# Patient Record
Sex: FEMALE | Race: BLACK OR AFRICAN AMERICAN | NOT HISPANIC OR LATINO | Employment: OTHER | URBAN - METROPOLITAN AREA
[De-identification: names, ages, dates, MRNs, and addresses within clinical notes are randomized per-mention and may not be internally consistent; named-entity substitution may affect disease eponyms.]

---

## 2024-02-15 ENCOUNTER — OFFICE VISIT (OUTPATIENT)
Dept: NEUROSURGERY | Facility: CLINIC | Age: 69
End: 2024-02-15
Payer: MEDICARE

## 2024-02-15 DIAGNOSIS — M48.061 FORAMINAL STENOSIS OF LUMBAR REGION: Primary | ICD-10-CM

## 2024-02-15 PROCEDURE — 99203 OFFICE O/P NEW LOW 30 MIN: CPT | Mod: S$GLB,,, | Performed by: STUDENT IN AN ORGANIZED HEALTH CARE EDUCATION/TRAINING PROGRAM

## 2024-02-15 RX ORDER — ALENDRONATE SODIUM 70 MG/1
70 TABLET ORAL
COMMUNITY

## 2024-02-15 RX ORDER — METHOCARBAMOL 500 MG/1
500 TABLET, FILM COATED ORAL 2 TIMES DAILY PRN
COMMUNITY
Start: 2023-09-08 | End: 2024-03-25 | Stop reason: SDUPTHER

## 2024-02-15 RX ORDER — ATORVASTATIN CALCIUM 20 MG/1
TABLET, FILM COATED ORAL DAILY
COMMUNITY

## 2024-02-15 RX ORDER — METOPROLOL TARTRATE 50 MG/1
50 TABLET ORAL 2 TIMES DAILY
COMMUNITY

## 2024-02-15 RX ORDER — ALPRAZOLAM 0.25 MG/1
0.25 TABLET ORAL
COMMUNITY
Start: 2023-10-26

## 2024-02-15 RX ORDER — TRAMADOL HYDROCHLORIDE 50 MG/1
50 TABLET ORAL DAILY PRN
COMMUNITY
Start: 2023-12-28

## 2024-02-15 RX ORDER — AMLODIPINE BESYLATE 5 MG/1
TABLET ORAL
COMMUNITY

## 2024-02-15 RX ORDER — GABAPENTIN 600 MG/1
600 TABLET ORAL 2 TIMES DAILY
COMMUNITY
Start: 2023-11-29 | End: 2024-05-01 | Stop reason: ALTCHOICE

## 2024-02-15 RX ORDER — PREGABALIN 50 MG/1
CAPSULE ORAL
COMMUNITY
Start: 2023-12-28 | End: 2024-05-01 | Stop reason: SDUPTHER

## 2024-02-15 NOTE — PROGRESS NOTES
Neurosurgery  History & Physical    SUBJECTIVE:     Chief Complaint: Low back, left hip, bilateral calf pain    History of Present Illness:  68 F smoker presents for eval of progressive worsening of low back, left hip and bilateral calf pain.  Her low back and leg pain are similar in intensity.  Her left hip pain is most noticed when she lays on her left side.  She endorses cramping pain in her bilateral calves especially at night.  She notes some mild improvement in her bilateral calf pain when she is walking.  She hasn't been to PT for her low back and leg symptoms.  She is actively trying to quit smoking.    Review of patient's allergies indicates:  Not on File    Current Outpatient Medications   Medication Sig Dispense Refill    alendronate (FOSAMAX) 70 MG tablet Take 70 mg by mouth every 7 days.      amLODIPine (NORVASC) 5 MG tablet TAKE ONE TABLET BY MOUTH EVERY DAY for 90 days      atorvastatin (LIPITOR) 20 MG tablet Take by mouth once daily.      gabapentin (NEURONTIN) 600 MG tablet Take 600 mg by mouth 2 (two) times daily.      LYRICA 50 mg capsule Take by mouth.      methocarbamoL (ROBAXIN) 500 MG Tab Take 500 mg by mouth 2 (two) times daily as needed.      metoprolol tartrate (LOPRESSOR) 50 MG tablet Take 50 mg by mouth 2 (two) times daily.      traMADoL (ULTRAM) 50 mg tablet Take 50 mg by mouth daily as needed.      XANAX 0.25 mg tablet Take 0.25 mg by mouth.       No current facility-administered medications for this visit.       No past medical history on file.  No past surgical history on file.  Family History    None       Social History     Socioeconomic History    Marital status: Legally        Review of Systems  14 point ROS was negative    OBJECTIVE:     Vital Signs  Pain Score:   8  There is no height or weight on file to calculate BMI.      Physical Exam:    Constitutional: She appears well-developed and well-nourished.     Eyes: Pupils are equal, round, and reactive to light.      Cardiovascular: Normal rate and regular rhythm.     Abdominal: Soft.     Psych/Behavior: She is alert. She is oriented to person, place, and time. She has a normal mood and affect.     Musculoskeletal: Gait is normal.        Neck: Range of motion is limited.        Back: Range of motion is limited.        Right Upper Extremities: Muscle strength is 5/5.        Left Upper Extremities: Muscle strength is 5/5.       Right Lower Extremities: Muscle strength is 5/5.        Left Lower Extremities: Muscle strength is 5/5.     Neurological:        Coordination: She has a normal Romberg Test and normal tandem walking coordination.        Sensory: There is no sensory deficit in the trunk. There is no sensory deficit in the extremities.        DTRs: DTRs are DTRS NORMAL AND SYMMETRICnormal and symmetric.        Cranial nerves: Cranial nerve(s) II, III, IV, V, VI, VII, VIII, IX, X, XI and XII are intact.     No hunter's bilaterally    +facet loading bilaterally near lumbosacral jxn    Diagnostic Results:  MRI L spine: stepwise minimal anterolisthesis L2-5.  Moderate right sided foraminal stenosis at L5/S1.  No other additional areas of high grade central or foraminal stenosis.  Mild bilateral facet effusions at L5/S1.  Diffuse loss of lumbar lordosis.  Reviewed    ASSESSMENT/PLAN:     68 F smoker who presents with chronic worsening of axial low back and BLE radicular leg pain.  Her imaging is described above.  Will plan to get scoli, flex/ex L, CT L, orders for PT/pain mgmt.  Fu once completed.  Stressed the importance of smoking cessation.

## 2024-02-22 ENCOUNTER — HOSPITAL ENCOUNTER (OUTPATIENT)
Dept: RADIOLOGY | Facility: HOSPITAL | Age: 69
Discharge: HOME | End: 2024-02-22
Attending: STUDENT IN AN ORGANIZED HEALTH CARE EDUCATION/TRAINING PROGRAM
Payer: MEDICARE

## 2024-02-22 DIAGNOSIS — M48.061 FORAMINAL STENOSIS OF LUMBAR REGION: ICD-10-CM

## 2024-02-22 PROCEDURE — 72114 X-RAY EXAM L-S SPINE BENDING: CPT | Mod: 26,,, | Performed by: RADIOLOGY

## 2024-02-22 PROCEDURE — 72131 CT LUMBAR SPINE W/O DYE: CPT | Mod: TC

## 2024-02-22 PROCEDURE — 72131 CT LUMBAR SPINE W/O DYE: CPT | Mod: 26,,, | Performed by: RADIOLOGY

## 2024-02-22 PROCEDURE — 72082 X-RAY EXAM ENTIRE SPI 2/3 VW: CPT | Mod: 26,,, | Performed by: STUDENT IN AN ORGANIZED HEALTH CARE EDUCATION/TRAINING PROGRAM

## 2024-02-22 PROCEDURE — 72114 X-RAY EXAM L-S SPINE BENDING: CPT | Mod: TC

## 2024-02-22 PROCEDURE — 72082 X-RAY EXAM ENTIRE SPI 2/3 VW: CPT | Mod: TC

## 2024-02-27 ENCOUNTER — TELEPHONE (OUTPATIENT)
Dept: PAIN MEDICINE | Facility: CLINIC | Age: 69
End: 2024-02-27

## 2024-02-27 ENCOUNTER — OFFICE VISIT (OUTPATIENT)
Dept: PAIN MEDICINE | Facility: CLINIC | Age: 69
End: 2024-02-27
Payer: MEDICARE

## 2024-02-27 VITALS — HEART RATE: 66 BPM | SYSTOLIC BLOOD PRESSURE: 145 MMHG | DIASTOLIC BLOOD PRESSURE: 82 MMHG

## 2024-02-27 DIAGNOSIS — M51.37 DDD (DEGENERATIVE DISC DISEASE), LUMBOSACRAL: Primary | ICD-10-CM

## 2024-02-27 DIAGNOSIS — M76.32 ILIOTIBIAL BAND SYNDROME AFFECTING LEFT LOWER LEG: ICD-10-CM

## 2024-02-27 DIAGNOSIS — M46.1 SACROILIITIS: ICD-10-CM

## 2024-02-27 DIAGNOSIS — M54.17 LUMBOSACRAL RADICULOPATHY: ICD-10-CM

## 2024-02-27 DIAGNOSIS — M48.061 FORAMINAL STENOSIS OF LUMBAR REGION: ICD-10-CM

## 2024-02-27 DIAGNOSIS — M70.62 GREATER TROCHANTERIC BURSITIS, LEFT: ICD-10-CM

## 2024-02-27 PROCEDURE — 99204 OFFICE O/P NEW MOD 45 MIN: CPT | Mod: S$GLB,,, | Performed by: EMERGENCY MEDICINE

## 2024-02-27 PROCEDURE — 99999 PR PBB SHADOW E&M-EST. PATIENT-LVL III: CPT | Mod: PBBFAC,,, | Performed by: EMERGENCY MEDICINE

## 2024-02-27 NOTE — TELEPHONE ENCOUNTER
----- Message from Mckinley Covarrubias MD sent at 2024 12:57 PM CST -----  Regarding: Order for GERI QUIROS    Patient Name: GERI QUIROS([id number])  Sex: Female  : 1955      PCP: KESHIA BANERJEE    Center: None     Types of orders made on 2024: Outpatient Referral, Procedure Request    Order Date:2024  Ordering User:MCKINLEY COVARRUBIAS [[id number]]  Encounter Provider:Mckinley Covarrubias MD [[id number]]  Authorizing Prov  ider: Mckinley Coavrrubias MD [[id number]]  Department:[facility][[id number]]    Common Order Information  Procedure -> Transforaminal Injection (Specify level and laterality) Cmt:             Bilateral L5/S1    Order Specific Information  Order: Procedure Order to Pain Management [Custom: [id number]]  Order #:          [id number]: 1 FUTURE    Priority: Routine  Class: Clinic Performed    Future Order I  nformation      Expires on:2025            Expected by:2024                   Associated Diagnoses      M51.37 DDD (degenerative disc disease), lumbosacral      M54.17 Lumbosacral radiculopathy      Physician -> bakari         Facility Name: -> [facility]           Priority: Routine  Class: Clinic Performed    Future Order Information      Expires on:2025            Expected by:                   Associated Diagnoses      M51.37 DDD (degenerative disc disease), lumbosacral      M54.17 Lumbosacral radiculopathy      Procedure -> Transforaminal Injection (Specify level and laterality) Cmt:                 Bilateral L5/S1        Physician -> bakari         Facility Name: -> [facility]

## 2024-02-27 NOTE — PROGRESS NOTES
This note was completed with dictation software and grammatical errors may exist.    PCP: Evita Gutierrez MD    Chief Complaint   Patient presents with    Low-back Pain    Hip Pain    Leg Pain    Shoulder Pain        Original HPI: Carmen Garibay is a 69 y.o. year old female patient who has no past medical history on file. She presents in referral from Dr. Ryder Cunningham for back pain    Original Pain Description:  The pain is located in the lower back and is radiating to the lateral ankles bilaterally.  The pain is described as burning, sharp, stabbing, and tingling. Exacerbating factors: Sitting, Standing, Bending, Walking, and Getting out of bed/chair. Mitigating factors medications. Symptoms interfere with daily activity and sleeping. The patient feels like symptoms have been worsening. Patient denies significant motor weakness. Pain has been present for several months.     Patient also complaining of left lateral hip pain for several months that is worse with walking. She also complains of pain in her left buttock as well.     PAIN SCORES:  Best: Pain is 9  Current: Pain is 9  Worst: Pain is 10        2/27/2024    10:55 AM   Last 3 PDI Scores   Pain Disability Index (PDI) 63       6 weeks of Conservative therapy:  PT: Not yet  Chiro:  HEP: Not yet      Treatments / Medications: (Ice/Heat/NSAIDS/APAP/etc):  Carlos 600mg - not taking due to side effects  Xanax 0.25mg  Lyrica 50mg BID  Robaxin 500mg qday  Tramadol 50mg PRN Qday    Antiplatelets/Anticoagulants:  No    Interventional Pain Procedures: (Previous injections)  None    IMAGING:    Images reviewed by me 2/27/24   MRI Lumbar spine: L5/S1 Moderate right sided foraminal stenosis, bilateral facet effusions at L5/S1.     No past surgical history on file.    Social History     Socioeconomic History    Marital status: Legally        Medications/Allergies: See med card    ROS:  GENERAL: No fever. No chills. No fatigue. Denies weight loss. Denies  "weight gain.  Back / musculoskeletal / neuro : See HPI    VITALS:   Vitals:    02/27/24 1059   BP: (!) 145/82   Pulse: 66   PainSc:   9   PainLoc: Back     There is no height or weight on file to calculate BMI.      2/27/2024    10:55 AM   Last 3 PDI Scores   Pain Disability Index (PDI) 63       PHYSICAL EXAM:   GENERAL: Well appearing, in no acute distress, alert and oriented x3.  PSYCH:  Mood and affect appropriate.  SKIN: Skin color, texture, turgor normal, no rashes or lesions.  HEENT:  Normocephalic, atraumatic. Cranial nerves grossly intact.  NECK: No pain to palpation over the cervical paraspinous muscles. No pain to palpation over facets. No pain with neck flexion, extension, or lateral flexion.   PULM: No evidence of respiratory difficulty, symmetric chest rise.  GI:  Non-distended  BACK:  Limited range of motion.  Tenderness to palpation at lower facets.  Pain with axial loading bilaterally.  Tenderness to palpation bilateral SI joints.    EXTREMITIES: No deformities, edema, or skin discoloration. Tenderness to palpation left GTB, left ITB and calf.  MUSCULOSKELETAL: Shoulder, hip, and knee provocative maneuvers are negative. No atrophy is noted.  NEURO: Sensation is equal and appropriate bilaterally. Bilateral upper and lower extremity strength is normal and symmetric. Bilateral upper and lower extremity coordination and muscle stretch reflexes are physiologic and symmetric. Plantar response are downgoing. Straight leg raising in the supine position is negative to radicular pain.   GAIT: normal.      LABS:    No results found for: "LABA1C", "HGBA1C"    No results found for: "WBC", "HGB", "HCT", "MCV", "PLT"        ASSESSMENT: 69 y.o. year old female with pain, consistent with:    Encounter Diagnoses   Name Primary?    Foraminal stenosis of lumbar region     DDD (degenerative disc disease), lumbosacral Yes    Lumbosacral radiculopathy     Sacroiliitis     Greater trochanteric bursitis, left     Iliotibial " band syndrome affecting left lower leg        DISCUSSION: Carmen Garibay is a patient referred to us by Neurosurgery who comes to us with chronic lower back pain with bilateral radiculopathy, left sacroiliitis and GTB.  Patient would like us to focus on the radicular component of her pain 1st.    PLAN:  - I have stressed the importance of physical activity and a home exercise plan to help with pain and improve health.  - Patient can continue with medications for now since they are providing benefits, using them appropriately, and without side effects.  - Counseled patient regarding the importance of activity modification and physical therapy.  - Interventions: Schedule for a bilateral Transforaminal epidural steroid injection at L5/S1 to help with their pain and progress with a home exercise plan.. Explained the risks and benefits of the procedure in detail with the patient today in clinic along with alternative treatment options, and the patient elected to pursue the intervention at this time.    - Anticoagulation use: No no anticoagulation  - Imaging: Reviewed available imaging with patient and answered any questions they had regarding study.  - Records: Reviewed/Obtain imaging from outside physicians    - The patient's pathophysiology was explained in detail with reference to x-rays, models, other visual aids as appropriate.   - Follow up visit: return to clinic in 2 weeks after procedure      I would like to thank Ryder Cunningham DO for the opportunity to assist in the care of this patient. We had a very nice visit and I look forward to continuing their care. Please let me know if I can be of further assistance.     Mckinley Covarrubias MD  02/27/2024

## 2024-02-27 NOTE — TELEPHONE ENCOUNTER
----- Message from Mckinley Covarrubias MD sent at 2024 12:57 PM CST -----  Regarding: Order for GERI QUIROS    Patient Name: GERI QUIROS([id number])  Sex: Female  : 1955      PCP: KESHIA BANERJEE    Center: None     Types of orders made on 2024: Outpatient Referral, Procedure Request    Order Date:2024  Ordering User:MCKINLEY COVARRUBIAS [[id number]]  Encounter Provider:Mckinley Covarrubias MD [[id number]]  Authorizing Prov  ider: Mckinley Covarrubias MD [[id number]]  Department:[facility][[id number]]    Common Order Information  Procedure -> Transforaminal Injection (Specify level and laterality) Cmt:             Bilateral L5/S1    Order Specific Information  Order: Procedure Order to Pain Management [Custom: [id number]]  Order #:          [id number]: 1 FUTURE    Priority: Routine  Class: Clinic Performed    Future Order I  nformation      Expires on:2025            Expected by:2024                   Associated Diagnoses      M51.37 DDD (degenerative disc disease), lumbosacral      M54.17 Lumbosacral radiculopathy      Physician -> bakari         Facility Name: -> [facility]           Priority: Routine  Class: Clinic Performed    Future Order Information      Expires on:2025            Expected by:                   Associated Diagnoses      M51.37 DDD (degenerative disc disease), lumbosacral      M54.17 Lumbosacral radiculopathy      Procedure -> Transforaminal Injection (Specify level and laterality) Cmt:                 Bilateral L5/S1        Physician -> bakari         Facility Name: -> [facility]

## 2024-03-11 ENCOUNTER — TELEPHONE (OUTPATIENT)
Dept: PAIN MEDICINE | Facility: CLINIC | Age: 69
End: 2024-03-11
Payer: MEDICARE

## 2024-03-14 ENCOUNTER — HOSPITAL ENCOUNTER (OUTPATIENT)
Dept: RADIOLOGY | Facility: OTHER | Age: 69
Discharge: HOME | End: 2024-03-14
Attending: INTERNAL MEDICINE
Payer: MEDICARE

## 2024-03-14 DIAGNOSIS — R91.1 LUNG NODULE: ICD-10-CM

## 2024-03-14 PROCEDURE — 71250 CT THORAX DX C-: CPT | Mod: 26,,, | Performed by: RADIOLOGY

## 2024-03-14 PROCEDURE — 71250 CT THORAX DX C-: CPT | Mod: TC

## 2024-03-15 ENCOUNTER — TELEPHONE (OUTPATIENT)
Dept: PAIN MEDICINE | Facility: CLINIC | Age: 69
End: 2024-03-15
Payer: MEDICARE

## 2024-03-15 NOTE — PRE-PROCEDURE INSTRUCTIONS
Pt currently on abx for a cyst, pt states she started on yesterday 3/14 and will be on for a total of 10 days.  Provider's office made aware.

## 2024-03-25 DIAGNOSIS — M54.17 LUMBOSACRAL RADICULOPATHY: Primary | ICD-10-CM

## 2024-03-25 RX ORDER — METHOCARBAMOL 500 MG/1
500 TABLET, FILM COATED ORAL 2 TIMES DAILY PRN
Qty: 60 TABLET | Refills: 1 | Status: SHIPPED | OUTPATIENT
Start: 2024-03-25

## 2024-04-02 ENCOUNTER — TELEPHONE (OUTPATIENT)
Dept: PAIN MEDICINE | Facility: CLINIC | Age: 69
End: 2024-04-02
Payer: MEDICARE

## 2024-04-05 ENCOUNTER — TELEPHONE (OUTPATIENT)
Dept: PAIN MEDICINE | Facility: CLINIC | Age: 69
End: 2024-04-05
Payer: MEDICARE

## 2024-04-05 NOTE — PRE-PROCEDURE INSTRUCTIONS
Patient reviewed on 4/5/2024.  Okay to proceed at [facility]. The following pre-procedure instructions and arrival time have been reviewed with patient via phone.  Patient verbalized an understanding.  Pt to be accompanied by  day of procedure as responsible .    Dear Carmen,    You are scheduled for a procedure with Dr. Covarrubias on 04/9/2024  Your scheduled arrival time is 9:00 am.  This arrival time is roughly 1 hour before your anticipated procedure time to allow sufficient time for pre-op..  Please wear comfortable clothes.  Most patients do not need to change into a gown.  Please do not wear a dress.  This procedure will take place at the [facility] at the corner of [facility] and [facility].  It is in the [facility] next to [facility].  The address is:    [address] [place] [address].  [place] [id number]    After entering the building, you will proceed to the second floor where you can check in with registration. You should take any medications that you routinely take for blood pressure, heart medications, thyroid, cholesterol, etc.     The fasting restrictions are dependent on whether or not you are receiving sedation.  Sedation is not available for all procedures.     Your fasting instructions are as follow:  IV sedation. You should not eat for 8 hours and can only drink clear liquids (water or black coffee without cream/sugar) up until 2 hours before your scheduled time.  You CANNOT drive yourself and must have a .    If you are on blood thinners, you need to follow the anticoagulation instructions that had been discussed previously.  You should only stop the blood thinners if it was approved by your primary care physician or your cardiologist.  In the event that you are not able to stop your blood thinners, a blood thinner was not listed on your medication list, or we were not able to get clearance from your cardiologist, then the procedure may have to  be postponed/canceled.     IF you were told to stop your blood thinners, this is how long you should generally hold some of the more common ones.  Remember that stopping blood thinners is only necessary for certain procedures. If you are unsure of your instructions, please call us.   Aspirin - 5 days  Plavix/Clopidogrel - 7 days  Warfarin / Coumadin - 5 days  Eliquis - 3 days  Pradaxa/Dabigatran - 4 days  Xarelto/Rivaroxaban - 3 days    If you are a diabetic, do not take your medication if you will be fasting, but bring it with you. Please plan on being here for roughly 2 hours.     Please call us if you have been sick (running fever, having any flu-like symptoms) or have been taking antibiotics in the past 2 weeks or had any outpatient procedures other than with us (colonoscopy, endoscopy, OBGYN, dental, etc.). If you have been previously COVID positive, you will need to hold off on your procedure until you are symptom free for 10 days. If you did not have any symptoms, you can have your procedure 10 days from your positive test result.      *HOLD ALL VITAMINS, MINERALS, HERBS (INCLUDING HERBAL TEAS) AND SUPPLEMENTS  *SHOWER WITH ANTIBACTERIAL SOAP (EX. DIAL) NIGHT BEFORE AND MORNING OF PROCEDURE  *DO NOT APPLY ANY LOTIONS, OILS, POWDERS, PERFUME/COLOGNE, OINTMENTS, GELS, CREAMS, MAKEUP OR DEODORANT TO YOUR SKIN MORNING OF PROCEDURE  *LEAVE JEWELRY AND ANY VALUABLES AT HOME  *WEAR LOOSE COMFORTABLE CLOTHING (PREFERABLY A BUTTON UP SHIRT)      Thank you,  [facility]  [place], LPN [facility]  Pre-Admit

## 2024-04-09 ENCOUNTER — TELEPHONE (OUTPATIENT)
Dept: PAIN MEDICINE | Facility: CLINIC | Age: 69
End: 2024-04-09
Payer: MEDICARE

## 2024-04-09 ENCOUNTER — HOSPITAL ENCOUNTER (OUTPATIENT)
Facility: HOSPITAL | Age: 69
Discharge: HOME | End: 2024-04-09
Attending: EMERGENCY MEDICINE | Admitting: EMERGENCY MEDICINE
Payer: MEDICARE

## 2024-04-09 VITALS
WEIGHT: 140 LBS | HEART RATE: 63 BPM | RESPIRATION RATE: 15 BRPM | SYSTOLIC BLOOD PRESSURE: 159 MMHG | OXYGEN SATURATION: 97 % | HEIGHT: 64 IN | DIASTOLIC BLOOD PRESSURE: 79 MMHG | TEMPERATURE: 99 F | BODY MASS INDEX: 23.9 KG/M2

## 2024-04-09 DIAGNOSIS — G89.29 CHRONIC PAIN: ICD-10-CM

## 2024-04-09 LAB — POCT GLUCOSE: 98 MG/DL (ref 70–110)

## 2024-04-09 PROCEDURE — 25000003 PHARM REV CODE 250: Performed by: EMERGENCY MEDICINE

## 2024-04-09 PROCEDURE — 64483 NJX AA&/STRD TFRM EPI L/S 1: CPT | Mod: 50,,, | Performed by: EMERGENCY MEDICINE

## 2024-04-09 PROCEDURE — 64483 NJX AA&/STRD TFRM EPI L/S 1: CPT | Mod: 50 | Performed by: EMERGENCY MEDICINE

## 2024-04-09 PROCEDURE — 99152 MOD SED SAME PHYS/QHP 5/>YRS: CPT | Performed by: EMERGENCY MEDICINE

## 2024-04-09 PROCEDURE — 25500020 PHARM REV CODE 255: Performed by: EMERGENCY MEDICINE

## 2024-04-09 PROCEDURE — 82962 GLUCOSE BLOOD TEST: CPT | Performed by: EMERGENCY MEDICINE

## 2024-04-09 PROCEDURE — 63600175 PHARM REV CODE 636 W HCPCS: Performed by: EMERGENCY MEDICINE

## 2024-04-09 RX ORDER — MIDAZOLAM HYDROCHLORIDE 1 MG/ML
INJECTION INTRAMUSCULAR; INTRAVENOUS
Status: DISCONTINUED | OUTPATIENT
Start: 2024-04-09 | End: 2024-04-09 | Stop reason: HOSPADM

## 2024-04-09 RX ORDER — LIDOCAINE HYDROCHLORIDE 20 MG/ML
INJECTION, SOLUTION EPIDURAL; INFILTRATION; INTRACAUDAL; PERINEURAL
Status: DISCONTINUED | OUTPATIENT
Start: 2024-04-09 | End: 2024-04-09 | Stop reason: HOSPADM

## 2024-04-09 RX ORDER — FENTANYL CITRATE 50 UG/ML
INJECTION, SOLUTION INTRAMUSCULAR; INTRAVENOUS
Status: DISCONTINUED | OUTPATIENT
Start: 2024-04-09 | End: 2024-04-09 | Stop reason: HOSPADM

## 2024-04-09 RX ORDER — LIDOCAINE HYDROCHLORIDE 10 MG/ML
INJECTION, SOLUTION EPIDURAL; INFILTRATION; INTRACAUDAL; PERINEURAL
Status: DISCONTINUED | OUTPATIENT
Start: 2024-04-09 | End: 2024-04-09 | Stop reason: HOSPADM

## 2024-04-09 RX ORDER — DEXAMETHASONE SODIUM PHOSPHATE 10 MG/ML
INJECTION INTRAMUSCULAR; INTRAVENOUS
Status: DISCONTINUED | OUTPATIENT
Start: 2024-04-09 | End: 2024-04-09 | Stop reason: HOSPADM

## 2024-04-09 NOTE — PLAN OF CARE
Patient AAOx3. Consents signed, H&P needed,  verified for ride home, all concerns addressed. Patient has no complaints at this time.

## 2024-04-09 NOTE — DISCHARGE SUMMARY
Discharge Note  Short Stay      SUMMARY     Admit Date: 4/9/2024    Attending Physician: Mckinley Covarrubias      Discharge Physician: Mckinley Covarrubias      Discharge Date: 4/9/2024 11:09 AM    Procedure(s) (LRB):  TFESI B/L L5/S1 (Bilateral)    Final Diagnosis: DDD (degenerative disc disease), lumbosacral [M51.37]  Lumbosacral radiculopathy [M54.17]    Disposition: Home or self care    Patient Instructions:   Current Discharge Medication List        CONTINUE these medications which have NOT CHANGED    Details   alendronate (FOSAMAX) 70 MG tablet Take 70 mg by mouth every 7 days.      amLODIPine (NORVASC) 5 MG tablet TAKE ONE TABLET BY MOUTH EVERY DAY for 90 days      atorvastatin (LIPITOR) 20 MG tablet Take by mouth once daily.      gabapentin (NEURONTIN) 600 MG tablet Take 600 mg by mouth 2 (two) times daily.      LYRICA 50 mg capsule Take by mouth.      methocarbamoL (ROBAXIN) 500 MG Tab Take 1 tablet (500 mg total) by mouth 2 (two) times daily as needed.  Qty: 60 tablet, Refills: 1    Associated Diagnoses: Lumbosacral radiculopathy      metoprolol tartrate (LOPRESSOR) 50 MG tablet Take 50 mg by mouth 2 (two) times daily.      traMADoL (ULTRAM) 50 mg tablet Take 50 mg by mouth daily as needed.      XANAX 0.25 mg tablet Take 0.25 mg by mouth.                 Discharge Diagnosis: DDD (degenerative disc disease), lumbosacral [M51.37]  Lumbosacral radiculopathy [M54.17]  Condition on Discharge: Stable with no complications to procedure   Diet on Discharge: Same as before.  Activity: as per instruction sheet.  Discharge to: Home with a responsible adult.  Follow up: 2-4 weeks       Please call my office or pager at [phone] if experienced any weakness or loss of sensation, fever > 101.5, pain uncontrolled with oral medications, persistent nausea/vomiting/or diarrhea, redness or drainage from the incisions, or any other worrisome concerns. If physician on call was not reached or could not communicate with our office for  any reason please go to the nearest emergency department

## 2024-04-09 NOTE — H&P
"HPI  Patient presenting for Procedure(s) (LRB):  TFESI B/L L5/S1 (Bilateral)     Patient on Anti-coagulation No    No health changes since previous encounter    Past Medical History:   Diagnosis Date    Diabetes mellitus     pre    Hypertension     Osteoporosis      Past Surgical History:   Procedure Laterality Date    HYSTERECTOMY      KNEE ARTHROSCOPY W/ MENISCECTOMY Right      Review of patient's allergies indicates:   Allergen Reactions    Codeine Nausea Only      No current facility-administered medications for this encounter.     Current Outpatient Medications   Medication Sig    alendronate (FOSAMAX) 70 MG tablet Take 70 mg by mouth every 7 days.    amLODIPine (NORVASC) 5 MG tablet TAKE ONE TABLET BY MOUTH EVERY DAY for 90 days    atorvastatin (LIPITOR) 20 MG tablet Take by mouth once daily.    gabapentin (NEURONTIN) 600 MG tablet Take 600 mg by mouth 2 (two) times daily.    LYRICA 50 mg capsule Take by mouth.    methocarbamoL (ROBAXIN) 500 MG Tab Take 1 tablet (500 mg total) by mouth 2 (two) times daily as needed.    metoprolol tartrate (LOPRESSOR) 50 MG tablet Take 50 mg by mouth 2 (two) times daily.    traMADoL (ULTRAM) 50 mg tablet Take 50 mg by mouth daily as needed.    XANAX 0.25 mg tablet Take 0.25 mg by mouth.       PMHx, PSHx, Allergies, Medications reviewed in epic    ROS negative except pain complaints in HPI    OBJECTIVE:    BP (!) 159/79 (BP Location: Left arm, Patient Position: Lying)   Pulse 63   Temp 98.8 °F (37.1 °C) (Temporal)   Resp 15   Ht 5' 4" (1.626 m)   Wt 63.5 kg (140 lb)   SpO2 97%   Breastfeeding No   BMI 24.03 kg/m²     PHYSICAL EXAMINATION:    GENERAL: Well appearing, in no acute distress, alert and oriented x3.  PSYCH:  Mood and affect appropriate.  SKIN: Skin color, texture, turgor normal, no rashes or lesions which will impact the procedure.  CV: RRR with palpation of the radial artery.  PULM: No evidence of respiratory difficulty, symmetric chest rise. Clear to " auscultation.  NEURO: Cranial nerves grossly intact.    Plan:    Proceed with procedure as planned Procedure(s) (LRB):  TFESI B/L L5/S1 (Bilateral)    Mckinley Covarrubias  04/09/2024

## 2024-04-09 NOTE — OP NOTE
Lumbar Transforaminal Epidural Steroid Injection under Fluoroscopic Guidance    The procedure, risks, benefits, and options were discussed with the patient. There are no contraindications to the procedure. The patent expressed understanding and agreed to the procedure. Informed written consent was obtained prior to the start of the procedure and can be found in the patient's chart.    PATIENT NAME: Carmen Garibay   MRN: [id number]     DATE OF PROCEDURE: 04/09/2024    PROCEDURE:  Bilateral  L5/S1 Lumbar Transforaminal Epidural Steroid Injection under Fluoroscopic Guidance    PRE-OP DIAGNOSIS: DDD (degenerative disc disease), lumbosacral [M51.37]  Lumbosacral radiculopathy [M54.17] Lumbar radiculopathy [M54.16]    POST-OP DIAGNOSIS: Same    PHYSICIAN: Mckinley Covarrubias MD    MEDICATIONS INJECTED: Preservative-free Decadron 10mg with 5cc of Lidocaine 1% MPF     LOCAL ANESTHETIC INJECTED: Xylocaine 2%     SEDATION: Versed 2mg and Fentanyl 50mcg                                                                                                                                                                                     Conscious sedation ordered by M.D. Patient re-evaluation prior to administration of conscious sedation. No changes noted in patient's status from initial evaluation. The patient's vital signs were monitored by RN and patient remained hemodynamically stable throughout the procedure.    Event Time In   Sedation Start 1050       ESTIMATED BLOOD LOSS: None    COMPLICATIONS: None    TECHNIQUE: Time-out was performed to identify the patient and procedure to be performed. With the patient laying in a prone position, the surgical area was prepped and draped in the usual sterile fashion using ChloraPrep and a fenestrated drape.The levels were determined under fluoroscopy guidance. Skin anesthesia was achieved by injecting Lidocaine 2% over the injection sites. The transforaminal spaces were then approached with a 22  gauge, 3.5 inch spinal quinke needle that was introduced under fluoroscopic guidance in the AP and Lateral views. Once the needle tip was in the area of the transforaminal space, and there was no blood, CSF or paraesthesias, contrast dye Omnipaque (300mg/mL) was injected to confirm placement and there was no vascular runoff. Fluoroscopic imaging in the AP and lateral views revealed a clear outline of the spinal nerve with proximal spread of agent through the neural foramen into the epidural space. 3 mL of the medication mixture listed above was injected slowly at each site. Displacement of the radio opaque contrast after injection of the medication confirmed that the medication went into the area of the transforaminal spaces. The needles were removed and bleeding was nil. A sterile dressing was applied. No specimens collected. The patient tolerated the procedure well.     The patient was monitored after the procedure in the recovery area. They were given post-procedure and discharge instructions to follow at home. The patient was discharged in a stable condition.        Mckinley Covarrubias MD

## 2024-04-09 NOTE — DISCHARGE INSTRUCTIONS
[facility] [place] [facility]  Dr. Mckinley Covarrubias  Messaging service # [phone]    POST-PROCEDURE INSTRUCTIONS:    Today you had an injection that included a steroid medications.  The steroid may or may not have been mixed with a local anesthetic when it was injected.   If the injection was in the neck, you may feel some pressure, numbness, or slight weakness in the arm after the procedure for a short period of time (this is a normal response), if this persists for longer than 1 day please contact our office or go to the emergency room.  If the injection was in the low back, you may feel some pressure, numbness, or slight weakness in the leg after the procedure for a short period of time (this is a normal response), if this persists for longer than 1 day please contact our office or go to the emergency room.  You may get side effects from the steroid.  This is not uncommon.  Symptoms include: elevated blood sugar, elevated blood pressure, headache, flushing, nausea, insomnia.  These symptoms are transient and will resolve within 1-3 days.  If symptoms last longer than this please contact our office or head to the emergency room.  Steroid medications can take anywhere from 3-14 days to take effect (rarely longer).  You may notice that your pain worsens for a short period of time after the injection, this would not be unusual due to the pressure and trauma from the needle.    If you do not have a follow up appointment scheduled, please contact my office (or the office of the physician who referred you for the procedure) to get a post-procedure follow up scheduled 2-4 weeks after the procedure.  This can be done as a virtual visit if that is more convenient for you.      What you need to do:    Keep a record of your response to the injection you had today.    How much relief did you get?   When did the relief start and how long did it last?  Were you able to decrease the use of any of your pain  medications?  Were you able to increase your level of activity?  How long did the relief last?    What to watch out for:    If you experience any of the following symptoms after your procedure, please notify the messaging service immediately (see above for contact information):   fever (increased oral temperature)   bleeding or swelling at the injection site,    drainage, rash or redness at the injection site    possible signs of infection    increased pain at the injection site   worsening of your usual pain   severe headache   new or worsening numbness    new arm and/or leg weakness, or    changes in bowel and/or bladder function: urinating or defecating on yourself and not knowing that you did it.    PLEASE FOLLOW ALL INSTRUCTIONS CAREFULLY     Do not engage in strenuous activity (e.g., lifting or pushing heavy objects or repeated bending) for 24 hours.     Do not take a bath, swim or use Jacuzzi for 24 hours after procedure. (A shower is fine).   Remove any Band-Aids when you get home.    Use cold/ice, as needed for comfort.  We recommend the use of cold therapy alternating on for 20 minutes, off for 20 minutes.    Do not apply direct heat (heating pad or heat packs) to the injection site for 24 hours.     Resume your usual medications, unless instructed otherwise by your Pain Physician.     If you are on warfarin (Coumadin) or other blood thinner, resume this medication as instructed by your prescribing Physician.    IF AT ANY POINT YOU ARE VERY CONCERNED ABOUT YOUR SYMPTOMS, PLEASE GO TO THE EMERGENCY ROOM.    If you develop worsening pain, weakness, numbness, lose bowel or bladder control (i.e., having an accident where you did not even know you had to go to the bathroom and suddenly noticed you soiled yourself), saddle anesthesia (a loss of sensation restricted to the area of the buttocks, anus and between the legs -- i.e., those parts of your body that would touch a saddle if you were sitting on one) you  need to go immediately to the emergency department for evaluation and treatment.    ----------------------------------------------------------------------------------------------------------------------------------------------------------------  If you received Sedation please read the following instructions:  POST SEDATION INSTRUCTIONS    Today you received intravenous medication (also known as sedation) that was used to help you relax and/or decrease discomfort during your procedure. This medication will be acting in your body for the next 24 hours, so you might feel a little tired or sleepy. This feeling will slowly wear off.   Common side effects associated with these medications include: drowsiness, dizziness, sleepiness, confusion, feeling excited, difficulty remembering things, lack of steadiness with walking or balance, loss of fine muscle control, slowed reflexes, difficulty focusing, and blurred vision.  Some over-the-counter and prescription medications (e.g., muscle relaxants, opioids, mood-altering medications, sedatives/hypnotics, antihistamines) can interact with the intravenous medication you received and cause an increased risk of the side effects listed above in addition to other potentially life threatening side effects. Use extreme caution if you are taking such medications, and consult with your Pain Physician or prescribing physician if you have any questions.  For the next 12-24 hours:    DO NOT--Drive a car, operate machinery or power tools   DO NOT--Drink any alcoholic beverages (not even beer), they may dangerously increase the risk of side effects.    DO NOT--Make any important legal or business decisions or sign important documents.  We advise you to have someone to assist you at home. Move slowly and carefully. Do not make sudden changes in position. Be aware of dizziness or light-headedness and move accordingly.   If you seek medical treatment within 24 hours, let the nurse or doctor  caring for you know that you have received the above medications. If you have any questions or concerns related to your sedation or treatment today please contact us.

## 2024-04-19 ENCOUNTER — TELEPHONE (OUTPATIENT)
Dept: NEUROSURGERY | Facility: CLINIC | Age: 69
End: 2024-04-19
Payer: MEDICARE

## 2024-05-01 ENCOUNTER — OFFICE VISIT (OUTPATIENT)
Dept: PAIN MEDICINE | Facility: CLINIC | Age: 69
End: 2024-05-01
Payer: MEDICARE

## 2024-05-01 VITALS
WEIGHT: 140 LBS | HEIGHT: 64 IN | DIASTOLIC BLOOD PRESSURE: 90 MMHG | BODY MASS INDEX: 23.9 KG/M2 | HEART RATE: 66 BPM | SYSTOLIC BLOOD PRESSURE: 154 MMHG

## 2024-05-01 DIAGNOSIS — M54.17 LUMBOSACRAL RADICULOPATHY: ICD-10-CM

## 2024-05-01 DIAGNOSIS — M51.37 DDD (DEGENERATIVE DISC DISEASE), LUMBOSACRAL: ICD-10-CM

## 2024-05-01 PROCEDURE — 1159F MED LIST DOCD IN RCRD: CPT | Mod: CPTII,S$GLB,, | Performed by: EMERGENCY MEDICINE

## 2024-05-01 PROCEDURE — 99214 OFFICE O/P EST MOD 30 MIN: CPT | Mod: S$GLB,,, | Performed by: EMERGENCY MEDICINE

## 2024-05-01 PROCEDURE — 1125F AMNT PAIN NOTED PAIN PRSNT: CPT | Mod: CPTII,S$GLB,, | Performed by: EMERGENCY MEDICINE

## 2024-05-01 PROCEDURE — 3008F BODY MASS INDEX DOCD: CPT | Mod: CPTII,S$GLB,, | Performed by: EMERGENCY MEDICINE

## 2024-05-01 PROCEDURE — 99999 PR PBB SHADOW E&M-EST. PATIENT-LVL III: CPT | Mod: PBBFAC,,, | Performed by: EMERGENCY MEDICINE

## 2024-05-01 PROCEDURE — 3080F DIAST BP >= 90 MM HG: CPT | Mod: CPTII,S$GLB,, | Performed by: EMERGENCY MEDICINE

## 2024-05-01 PROCEDURE — 3288F FALL RISK ASSESSMENT DOCD: CPT | Mod: CPTII,S$GLB,, | Performed by: EMERGENCY MEDICINE

## 2024-05-01 PROCEDURE — 1101F PT FALLS ASSESS-DOCD LE1/YR: CPT | Mod: CPTII,S$GLB,, | Performed by: EMERGENCY MEDICINE

## 2024-05-01 PROCEDURE — 3077F SYST BP >= 140 MM HG: CPT | Mod: CPTII,S$GLB,, | Performed by: EMERGENCY MEDICINE

## 2024-05-01 RX ORDER — PREGABALIN 50 MG/1
50 CAPSULE ORAL 2 TIMES DAILY
Qty: 60 CAPSULE | Refills: 0 | Status: SHIPPED | OUTPATIENT
Start: 2024-05-01 | End: 2024-05-02 | Stop reason: SDUPTHER

## 2024-05-01 NOTE — PROGRESS NOTES
Chronic Pain-Established (Follow up visit)       Interval History 05/01/24:  Patient presents for follow-up of a L5/S1 bilateral TFESI performed on 04/09/2024 and she reports that her back pain is 80% relieved. She reports that she has pain in her left shoulder that is intermittent and feels like needles. This started 2 weeks. Patient reports that in the evening her ankles and feet swell. She has been to her PCP who did an US and ruled out a DVT and explained that this is PVD.     Original HPI: Carmen Garibay is a 69 y.o. year old female patient who has a past medical history of Diabetes mellitus, Hypertension, and Osteoporosis. She presents in referral from No ref. provider found for back pain    Original Pain Description:  The pain is located in the lower back and is radiating to the lateral ankles bilaterally.  The pain is described as burning, sharp, stabbing, and tingling. Exacerbating factors: Sitting, Standing, Bending, Walking, and Getting out of bed/chair. Mitigating factors medications. Symptoms interfere with daily activity and sleeping. The patient feels like symptoms have been worsening. Patient denies significant motor weakness. Pain has been present for several months.     Patient also complaining of left lateral hip pain for several months that is worse with walking. She also complains of pain in her left buttock as well.     PAIN SCORES:  Best: Pain is 9  Current: Pain is 9  Worst: Pain is 10        5/1/2024    10:11 AM   Last 3 PDI Scores   Pain Disability Index (PDI) 42       6 weeks of Conservative therapy:  PT: Not yet  Chiro:  HEP: Not yet      Treatments / Medications: (Ice/Heat/NSAIDS/APAP/etc):  Carlos 600mg - not taking due to side effects  Xanax 0.25mg  Lyrica 50mg BID  Robaxin 500mg qday  Tramadol 50mg PRN Qday    Antiplatelets/Anticoagulants:  No    Interventional Pain Procedures: (Previous injections)  04/09/2024 L5/S1 bilateral TFESI    IMAGING:    Images reviewed by me 2/27/24   MRI  "Lumbar spine: L5/S1 Moderate right sided foraminal stenosis, bilateral facet effusions at L5/S1.     Past Surgical History:   Procedure Laterality Date    HYSTERECTOMY      INJECTION, SPINE, LUMBOSACRAL, TRANSFORAMINAL APPROACH Bilateral 4/9/2024    Procedure: TFESI B/L L5/S1;  Surgeon: Mckinley Covarrubias MD;  Location: [facility];  Service: Pain Management;  Laterality: Bilateral;  30mins    KNEE ARTHROSCOPY W/ MENISCECTOMY Right        Social History     Socioeconomic History    Marital status: Legally    Tobacco Use    Smoking status: Every Day     Current packs/day: 0.25     Average packs/day: 0.3 packs/day for 0.1 years     Types: Cigarettes     Start date: 4/8/2024     Passive exposure: Past    Smokeless tobacco: Never   Substance and Sexual Activity    Alcohol use: Never    Drug use: Never       Medications/Allergies: See med card    ROS:  GENERAL: No fever. No chills. No fatigue. Denies weight loss. Denies weight gain.  Back / musculoskeletal / neuro : See HPI    VITALS:   Vitals:    05/01/24 1005   BP: (!) 154/90   Pulse: 66   Weight: 63.5 kg (139 lb 15.9 oz)   Height: 5' 4" (1.626 m)   PainSc:   9   PainLoc: Leg     Body mass index is 24.03 kg/m².      5/1/2024    10:11 AM 2/27/2024    10:55 AM   Last 3 PDI Scores   Pain Disability Index (PDI) 42 63       PHYSICAL EXAM:   GENERAL: Well appearing, in no acute distress, alert and oriented x3.  PSYCH:  Mood and affect appropriate.  SKIN: Skin color, texture, turgor normal, no rashes or lesions.  HEENT:  Normocephalic, atraumatic. Cranial nerves grossly intact.  NECK: No pain to palpation over the cervical paraspinous muscles. No pain to palpation over facets. No pain with neck flexion, extension, or lateral flexion.   PULM: No evidence of respiratory difficulty, symmetric chest rise.  GI:  Non-distended  BACK:  Limited range of motion.  Tenderness to palpation at lower facets.  Pain with axial loading bilaterally.  Tenderness to palpation " "bilateral SI joints.    EXTREMITIES: No deformities, edema, or skin discoloration. Tenderness to palpation left GTB, left ITB and calf.  MUSCULOSKELETAL: Shoulder, hip, and knee provocative maneuvers are negative. No atrophy is noted.  NEURO: Sensation is equal and appropriate bilaterally. Bilateral upper and lower extremity strength is normal and symmetric. Bilateral upper and lower extremity coordination and muscle stretch reflexes are physiologic and symmetric. Plantar response are downgoing. Straight leg raising in the supine position is negative to radicular pain.   GAIT: normal.      LABS:    No results found for: "LABA1C", "HGBA1C"    No results found for: "WBC", "HGB", "HCT", "MCV", "PLT"        ASSESSMENT: 69 y.o. year old female with pain, consistent with:    Encounter Diagnoses   Name Primary?    Lumbosacral radiculopathy     DDD (degenerative disc disease), lumbosacral        DISCUSSION: Carmen Garibay is a patient referred to us by Neurosurgery who comes to us with chronic lower back pain with bilateral radiculopathy and today's complaining of bilateral lower extremity edema secondary to peripheral vascular disease as well as chronic shoulder pain.    PLAN:  - I have stressed the importance of physical activity and a home exercise plan to help with pain and improve health.  - Patient can continue with medications for now since they are providing benefits, using them appropriately, and without side effects.  - Counseled patient regarding the importance of activity modification and physical therapy.  - offered referral to Orthopedics for her shoulder, but patient states she will follow-up with her own orthopedist.  - on previous visit patient was noted to have left sacroiliitis and GTB, will consider injection in 4-6 weeks' time.  - medications:  Refill Lyrica 50 mg 1 p.o. b.i.d.  - Imaging: Reviewed available imaging with patient and answered any questions they had regarding study.  - Records: " Reviewed/Obtain imaging from outside physicians    - Patient will follow up with her orthopedist for her shoulder  - The patient's pathophysiology was explained in detail with reference to x-rays, models, other visual aids as appropriate.   - Follow up visit: return to clinic in 4-6weeks       Mckinley Covarrubias MD  05/01/2024

## 2024-05-02 ENCOUNTER — TELEPHONE (OUTPATIENT)
Dept: PAIN MEDICINE | Facility: CLINIC | Age: 69
End: 2024-05-02
Payer: MEDICARE

## 2024-05-02 RX ORDER — PREGABALIN 50 MG/1
50 CAPSULE ORAL 2 TIMES DAILY
Qty: 60 CAPSULE | Refills: 0 | Status: SHIPPED | OUTPATIENT
Start: 2024-05-02 | End: 2024-06-01

## 2024-05-02 NOTE — TELEPHONE ENCOUNTER
----- Message from Mckinley Covarrubias MD sent at 5/2/2024  8:25 AM CDT -----  Contact: [phone]  They did receive the prescription, but they didn't want to fill it. I will change to generic and see if it gets covered this way  ----- Message -----  From: Albert Roy MA  Sent: 5/1/2024   4:51 PM CDT  To: Mckinley Covarrubias MD    Please see refill request. Thank you.  ----- Message -----  From: Dipika Tyler  Sent: 5/1/2024   4:40 PM CDT  To: Satish Sen Staff    Requesting an RX refill or new RX.  Is this a refill or new RX:   RX name and strength Pregabalin 50  Is this a 30 day or 90 day RX:   Pharmacy name and phone # (copy/paste from chart):      [facility] - [place], [place] - [address] [facility]  [id number] [facility] [address]  Phone: [phone] Fax: [phone]    Pharmacy haven't received yet

## 2024-06-09 PROCEDURE — 99285 EMERGENCY DEPT VISIT HI MDM: CPT

## 2024-06-10 ENCOUNTER — HOSPITAL ENCOUNTER (EMERGENCY)
Facility: OTHER | Age: 69
Discharge: HOME | End: 2024-06-10
Attending: EMERGENCY MEDICINE
Payer: MEDICARE

## 2024-06-10 VITALS
WEIGHT: 155 LBS | RESPIRATION RATE: 17 BRPM | SYSTOLIC BLOOD PRESSURE: 164 MMHG | OXYGEN SATURATION: 97 % | DIASTOLIC BLOOD PRESSURE: 76 MMHG | TEMPERATURE: 98 F | HEIGHT: 64 IN | BODY MASS INDEX: 26.46 KG/M2 | HEART RATE: 66 BPM

## 2024-06-10 DIAGNOSIS — G89.29 CHRONIC PAIN OF LEFT LOWER EXTREMITY: Primary | ICD-10-CM

## 2024-06-10 DIAGNOSIS — G89.29 CHRONIC PAIN IN LEFT SHOULDER: ICD-10-CM

## 2024-06-10 DIAGNOSIS — M79.605 CHRONIC PAIN OF LEFT LOWER EXTREMITY: Primary | ICD-10-CM

## 2024-06-10 DIAGNOSIS — M25.512 CHRONIC PAIN IN LEFT SHOULDER: ICD-10-CM

## 2024-06-10 PROCEDURE — 25000003 PHARM REV CODE 250: Performed by: EMERGENCY MEDICINE

## 2024-06-10 PROCEDURE — 63600175 PHARM REV CODE 636 W HCPCS: Performed by: EMERGENCY MEDICINE

## 2024-06-10 PROCEDURE — 96372 THER/PROPH/DIAG INJ SC/IM: CPT | Performed by: EMERGENCY MEDICINE

## 2024-06-10 RX ORDER — MELOXICAM 15 MG/1
TABLET ORAL
COMMUNITY

## 2024-06-10 RX ORDER — GABAPENTIN 300 MG/1
1 CAPSULE ORAL
COMMUNITY

## 2024-06-10 RX ORDER — HYDROMORPHONE HYDROCHLORIDE 1 MG/ML
0.25 INJECTION, SOLUTION INTRAMUSCULAR; INTRAVENOUS; SUBCUTANEOUS
Status: COMPLETED | OUTPATIENT
Start: 2024-06-10 | End: 2024-06-10

## 2024-06-10 RX ORDER — METOPROLOL SUCCINATE 50 MG/1
50 TABLET, EXTENDED RELEASE ORAL
COMMUNITY

## 2024-06-10 RX ORDER — ONDANSETRON 8 MG/1
8 TABLET, ORALLY DISINTEGRATING ORAL
Status: COMPLETED | OUTPATIENT
Start: 2024-06-10 | End: 2024-06-10

## 2024-06-10 RX ADMIN — ONDANSETRON 8 MG: 8 TABLET, ORALLY DISINTEGRATING ORAL at 02:06

## 2024-06-10 RX ADMIN — HYDROMORPHONE HYDROCHLORIDE 0.25 MG: 0.5 INJECTION, SOLUTION INTRAMUSCULAR; INTRAVENOUS; SUBCUTANEOUS at 02:06

## 2024-06-10 NOTE — ED PROVIDER NOTES
Encounter Date: 6/9/2024       History     Chief Complaint   Patient presents with    Shoulder Pain     Reports worsening L shoulder pain x 1 year. Also reports bilateral leg swelling for 9 months. States she came in tonight due to pain in legs and L shoulder.  Denies shortness of breath, falls       69-year-old female with history of prediabetes, hypertension, osteoporosis, PID, CKD, COPD and bilateral lumbar radiculopathy presents complaining of left hip pain and left shoulder pain as well as left lower leg pain as well as shiny appearance to her left leg and focal muscle cramping.  The patient states that all of the symptoms has been ongoing for months and denies any new symptoms tonight.  She states that she has been evaluated by both her primary care doctor and pain management for the symptoms and her workup has included an ultrasound to rule out a DVT and x-rays of the hip and shoulder.  She states that she has been told her some kind of rotator cuff injury to the left shoulder.  She was here tonight because she was soaking in the tub and had difficulty getting out of the tub as result pain.  She states that her  was concerned for stroke but at the same time states that her leg has been giving out 4 months and insists that there is nothing new tonight.  She is on gabapentin, meloxicam, Robaxin and tramadol for her pain.      Review of patient's allergies indicates:   Allergen Reactions    Codeine Nausea Only     Past Medical History:   Diagnosis Date    Diabetes mellitus     pre    Hypertension     Osteoporosis      Past Surgical History:   Procedure Laterality Date    HYSTERECTOMY      INJECTION, SPINE, LUMBOSACRAL, TRANSFORAMINAL APPROACH Bilateral 4/9/2024    Procedure: TFESI B/L L5/S1;  Surgeon: Mckinley Covarrubias MD;  Location: [facility];  Service: Pain Management;  Laterality: Bilateral;  30mins    KNEE ARTHROSCOPY W/ MENISCECTOMY Right      No family history on file.  Social History      Tobacco Use    Smoking status: Every Day     Current packs/day: 0.25     Average packs/day: 0.3 packs/day for 0.2 years     Types: Cigarettes     Start date: 4/8/2024     Passive exposure: Past    Smokeless tobacco: Never   Substance Use Topics    Alcohol use: Never    Drug use: Never     Review of Systems    Physical Exam     Initial Vitals [06/09/24 2331]   BP Pulse Resp Temp SpO2   (!) 182/86 67 18 97.9 °F (36.6 °C) 95 %      MAP       --         Physical Exam    Nursing note and vitals reviewed.  Constitutional: She appears well-developed and well-nourished. She is not diaphoretic. No distress.   HENT:   Head: Normocephalic and atraumatic.   Right Ear: External ear normal.   Left Ear: External ear normal.   Nose: Nose normal.   Eyes: Conjunctivae and EOM are normal.   Cardiovascular:  Normal rate, regular rhythm, normal heart sounds and intact distal pulses.     Exam reveals no friction rub.       No murmur heard.  2+ DP bilaterally   Pulmonary/Chest: Breath sounds normal. No respiratory distress. She has no wheezes. She has no rhonchi. She has no rales.   Musculoskeletal:         General: No edema. Normal range of motion.     Neurological: She is alert and oriented to person, place, and time. She has normal strength.   Decreased sensation to light touch along median distribution of the left hand.  Strength 5/5.  2+ radial pulses.  Range of motion of both shoulders limited secondary to pain.  Tenderness to palpation of the anterior left lower leg.  No pitting edema.  No calf tenderness.   Skin:   Extremities warm to the touch.  Without erythema         ED Course   Procedures  Labs Reviewed - No data to display       Imaging Results    None          Medications   HYDROmorphone injection 0.25 mg (0.25 mg Intramuscular Given 6/10/24 0254)   ondansetron disintegrating tablet 8 mg (8 mg Oral Given 6/10/24 0254)     Medical Decision Making  69-year-old female presents with chronic left shoulder left hip and left  lower leg pain.  The patient denies any new symptoms or any recent injuries.  Do not suspect a fracture and/or dislocation given lack of any trauma or focal bony tenderness.  She states that she was told she has a rotator cuff injury.  I can see the MRI from February 2024 was performed but the report is not available.  Leg pain is over the muscle.  Do not suspect fracture.  She had an ultrasound of the left leg negative for DVT.  According to the pain management clinic note, the pain has been attributed to PAD.  Exam findings today not concerning for a DVT.  I do not feel repeat ultrasound is necessary.  Further pain control will be deferred to her pain management specialist.    Risk  Prescription drug management.                                      Clinical Impression:  Final diagnoses:  [M79.605, G89.29] Chronic pain of left lower extremity (Primary)  [M25.512, G89.29] Chronic pain in left shoulder          ED Disposition Condition    Discharge Stable          ED Prescriptions    None       Follow-up Information       Follow up With Specialties Details Why Contact Info    Evita Gutierrez MD Internal Medicine   [address] [place] [id number]  [phone]      Pain management                 Kareen Stern MD  06/10/24 0258

## 2024-06-11 NOTE — PROGRESS NOTES
Chronic Pain-Established (Follow up visit)       Interval History 06/13/24: Patient on previous visit patient was noted to have left sacroiliitis and GTB, but we decided to treat conservatively. However, she recently went to the ER for a pain in her left calf that was unbearable. She reports intermittent numbness to bilateral feet. She reports that she has follow up tomorrow with her Pain Medicine specialist Dr. Theo Sanches     Interval History 05/01/24:  Patient presents for follow-up of a L5/S1 bilateral TFESI performed on 04/09/2024 and she reports that her back pain is 80% relieved. She reports that she has pain in her left shoulder that is intermittent and feels like needles. This started 2 weeks. Patient reports that in the evening her ankles and feet swell. She has been to her PCP who did an US and ruled out a DVT and explained that this is PVD.     Original HPI: Carmen Garibay is a 69 y.o. year old female patient who has a past medical history of Chronic pain, Diabetes mellitus, Hypertension, and Osteoporosis. She presents in referral from No ref. provider found for back pain    Original Pain Description:  The pain is located in the lower back and is radiating to the lateral ankles bilaterally.  The pain is described as burning, sharp, stabbing, and tingling. Exacerbating factors: Sitting, Standing, Bending, Walking, and Getting out of bed/chair. Mitigating factors medications. Symptoms interfere with daily activity and sleeping. The patient feels like symptoms have been worsening. Patient denies significant motor weakness. Pain has been present for several months.     Patient also complaining of left lateral hip pain for several months that is worse with walking. She also complains of pain in her left buttock as well.     PAIN SCORES:  Best: Pain is 9  Current: Pain is 9  Worst: Pain is 10        6/12/2024     1:35 PM   Last 3 PDI Scores   Pain Disability Index (PDI) 42       6 weeks of Conservative  "therapy:  PT: Not yet  Chiro:  HEP: Not yet      Treatments / Medications: (Ice/Heat/NSAIDS/APAP/etc):  Carlos 600mg - not taking due to side effects  Xanax 0.25mg  Lyrica 50mg BID  Robaxin 500mg qday  Tramadol 50mg PRN Qday    Antiplatelets/Anticoagulants:  No    Interventional Pain Procedures: (Previous injections)  04/09/2024 L5/S1 bilateral TFESI    IMAGING:    Images reviewed by me 2/27/24   MRI Lumbar spine: L5/S1 Moderate right sided foraminal stenosis, bilateral facet effusions at L5/S1.     Past Surgical History:   Procedure Laterality Date    HYSTERECTOMY      INJECTION, SPINE, LUMBOSACRAL, TRANSFORAMINAL APPROACH Bilateral 4/9/2024    Procedure: TFESI B/L L5/S1;  Surgeon: Mckinley Covarrubias MD;  Location: [facility];  Service: Pain Management;  Laterality: Bilateral;  30mins    KNEE ARTHROSCOPY W/ MENISCECTOMY Right        Social History     Socioeconomic History    Marital status: Legally    Tobacco Use    Smoking status: Every Day     Current packs/day: 0.25     Average packs/day: 0.3 packs/day for 0.2 years     Types: Cigarettes     Start date: 4/8/2024     Passive exposure: Past    Smokeless tobacco: Never   Substance and Sexual Activity    Alcohol use: Never    Drug use: Never       Medications/Allergies: See med card    ROS:  GENERAL: No fever. No chills. No fatigue. Denies weight loss. Denies weight gain.  Back / musculoskeletal / neuro : See HPI    VITALS:   Vitals:    06/12/24 1314   BP: (!) 159/85   Pulse: 61   Weight: 70.3 kg (154 lb 15.7 oz)   Height: 5' 4" (1.626 m)   PainSc:   7   PainLoc: Back       Body mass index is 26.6 kg/m².      6/12/2024     1:35 PM 5/1/2024    10:11 AM 2/27/2024    10:55 AM   Last 3 PDI Scores   Pain Disability Index (PDI) 42 42 63       PHYSICAL EXAM:   GENERAL: Well appearing, in no acute distress, alert and oriented x3.  PSYCH:  Mood and affect appropriate.  SKIN: Skin color, texture, turgor normal, no rashes or lesions.  HEENT:  Normocephalic, " "atraumatic. Cranial nerves grossly intact.  NECK: No pain to palpation over the cervical paraspinous muscles. No pain to palpation over facets. No pain with neck flexion, extension, or lateral flexion.   PULM: No evidence of respiratory difficulty, symmetric chest rise.  GI:  Non-distended  BACK:  Limited range of motion.  Tenderness to palpation at lower facets.  Pain with axial loading bilaterally.  Tenderness to palpation bilateral SI joints.    EXTREMITIES: No deformities, edema, or skin discoloration. Tenderness to palpation left GTB, left ITB and calf.  MUSCULOSKELETAL: Shoulder, hip, and knee provocative maneuvers are negative. No atrophy is noted.  NEURO: Sensation is equal and appropriate bilaterally. Bilateral upper and lower extremity strength is normal and symmetric. Bilateral upper and lower extremity coordination and muscle stretch reflexes are physiologic and symmetric. Plantar response are downgoing. Straight leg raising in the supine position is negative to radicular pain.   GAIT: normal.      LABS:    No results found for: "LABA1C", "HGBA1C"    No results found for: "WBC", "HGB", "HCT", "MCV", "PLT"        ASSESSMENT: 69 y.o. year old female with pain, consistent with:    Encounter Diagnoses   Name Primary?    Greater trochanteric bursitis, left Yes    Sacroiliitis     Lumbosacral radiculopathy     DDD (degenerative disc disease), lumbosacral          DISCUSSION: Carmen Garibay is a patient referred to us by Neurosurgery who comes to us with chronic lower back pain with bilateral radiculopathy and today's complaining of bilateral lower extremity edema secondary to peripheral vascular disease as well as chronic shoulder pain. She had relief of her back pain with the bilateral TFESI, but now her left radicular pain has returned. She is still having left GTB and SI pain.  Patient reports that she was previously under the care of Dr. Sanches, Pain Management and will be following up with him.  "     PLAN:  - I have stressed the importance of physical activity and a home exercise plan to help with pain and improve health.  - Patient can continue with medications for now since they are providing benefits, using them appropriately, and without side effects.  - Counseled patient regarding the importance of activity modification and physical therapy.  - Medications:  Lyrica 50 mg 1 p.o. b.i.d to be prescribed moving forward by her Pain Management provider.  - Follow up visit: PABLITO Covarrubias MD  06/12/2024

## 2024-06-12 ENCOUNTER — OFFICE VISIT (OUTPATIENT)
Dept: PAIN MEDICINE | Facility: CLINIC | Age: 69
End: 2024-06-12
Payer: MEDICARE

## 2024-06-12 VITALS
HEART RATE: 61 BPM | SYSTOLIC BLOOD PRESSURE: 159 MMHG | BODY MASS INDEX: 26.46 KG/M2 | DIASTOLIC BLOOD PRESSURE: 85 MMHG | WEIGHT: 155 LBS | HEIGHT: 64 IN

## 2024-06-12 DIAGNOSIS — M54.17 LUMBOSACRAL RADICULOPATHY: ICD-10-CM

## 2024-06-12 DIAGNOSIS — M70.62 GREATER TROCHANTERIC BURSITIS, LEFT: Primary | ICD-10-CM

## 2024-06-12 DIAGNOSIS — M46.1 SACROILIITIS: ICD-10-CM

## 2024-06-12 DIAGNOSIS — M51.37 DDD (DEGENERATIVE DISC DISEASE), LUMBOSACRAL: ICD-10-CM

## 2024-06-12 PROCEDURE — 3288F FALL RISK ASSESSMENT DOCD: CPT | Mod: CPTII,S$GLB,, | Performed by: EMERGENCY MEDICINE

## 2024-06-12 PROCEDURE — 3077F SYST BP >= 140 MM HG: CPT | Mod: CPTII,S$GLB,, | Performed by: EMERGENCY MEDICINE

## 2024-06-12 PROCEDURE — 1125F AMNT PAIN NOTED PAIN PRSNT: CPT | Mod: CPTII,S$GLB,, | Performed by: EMERGENCY MEDICINE

## 2024-06-12 PROCEDURE — 1101F PT FALLS ASSESS-DOCD LE1/YR: CPT | Mod: CPTII,S$GLB,, | Performed by: EMERGENCY MEDICINE

## 2024-06-12 PROCEDURE — 99213 OFFICE O/P EST LOW 20 MIN: CPT | Mod: S$GLB,,, | Performed by: EMERGENCY MEDICINE

## 2024-06-12 PROCEDURE — 3008F BODY MASS INDEX DOCD: CPT | Mod: CPTII,S$GLB,, | Performed by: EMERGENCY MEDICINE

## 2024-06-12 PROCEDURE — 1159F MED LIST DOCD IN RCRD: CPT | Mod: CPTII,S$GLB,, | Performed by: EMERGENCY MEDICINE

## 2024-06-12 PROCEDURE — 99999 PR PBB SHADOW E&M-EST. PATIENT-LVL III: CPT | Mod: PBBFAC,,, | Performed by: EMERGENCY MEDICINE

## 2024-06-12 PROCEDURE — 3079F DIAST BP 80-89 MM HG: CPT | Mod: CPTII,S$GLB,, | Performed by: EMERGENCY MEDICINE

## 2025-01-14 ENCOUNTER — HOSPITAL ENCOUNTER (OUTPATIENT)
Dept: RADIOLOGY | Facility: OTHER | Age: 70
Discharge: HOME | End: 2025-01-14
Attending: INTERNAL MEDICINE
Payer: MEDICARE

## 2025-01-14 DIAGNOSIS — Z78.0 POSTMENOPAUSAL ESTROGEN DEFICIENCY: ICD-10-CM

## 2025-01-14 PROCEDURE — 77080 DXA BONE DENSITY AXIAL: CPT | Mod: TC

## 2025-01-14 PROCEDURE — 77080 DXA BONE DENSITY AXIAL: CPT | Mod: 26,,, | Performed by: RADIOLOGY

## 2025-06-20 ENCOUNTER — HOSPITAL ENCOUNTER (OUTPATIENT)
Dept: RADIOLOGY | Facility: OTHER | Age: 70
Discharge: HOME | End: 2025-06-20
Attending: INTERNAL MEDICINE
Payer: MEDICARE

## 2025-06-20 DIAGNOSIS — F17.210 TOBACCO DEPENDENCE DUE TO CIGARETTES: ICD-10-CM

## 2025-06-20 PROCEDURE — 71250 CT THORAX DX C-: CPT | Mod: TC

## 2025-06-20 PROCEDURE — 71250 CT THORAX DX C-: CPT | Mod: 26,,, | Performed by: INTERNAL MEDICINE
